# Patient Record
Sex: FEMALE | Race: ASIAN | NOT HISPANIC OR LATINO | Employment: STUDENT | URBAN - METROPOLITAN AREA
[De-identification: names, ages, dates, MRNs, and addresses within clinical notes are randomized per-mention and may not be internally consistent; named-entity substitution may affect disease eponyms.]

---

## 2022-09-14 ENCOUNTER — PROCEDURE VISIT (OUTPATIENT)
Dept: SURGERY | Facility: CLINIC | Age: 22
End: 2022-09-14
Payer: COMMERCIAL

## 2022-09-14 VITALS
WEIGHT: 122 LBS | BODY MASS INDEX: 22.45 KG/M2 | SYSTOLIC BLOOD PRESSURE: 118 MMHG | HEART RATE: 98 BPM | TEMPERATURE: 98.5 F | DIASTOLIC BLOOD PRESSURE: 68 MMHG | OXYGEN SATURATION: 99 % | RESPIRATION RATE: 18 BRPM | HEIGHT: 62 IN

## 2022-09-14 DIAGNOSIS — L73.2 HIDRADENITIS SUPPURATIVA OF LEFT AXILLA: Primary | ICD-10-CM

## 2022-09-14 PROCEDURE — 87070 CULTURE OTHR SPECIMN AEROBIC: CPT | Performed by: SURGERY

## 2022-09-14 PROCEDURE — 87205 SMEAR GRAM STAIN: CPT | Performed by: SURGERY

## 2022-09-14 PROCEDURE — 99203 OFFICE O/P NEW LOW 30 MIN: CPT | Performed by: SURGERY

## 2022-09-14 RX ORDER — SULFAMETHOXAZOLE AND TRIMETHOPRIM 800; 160 MG/1; MG/1
1 TABLET ORAL EVERY 12 HOURS SCHEDULED
COMMUNITY
End: 2022-09-19

## 2022-09-14 RX ORDER — CEPHALEXIN 500 MG/1
500 CAPSULE ORAL EVERY 6 HOURS SCHEDULED
COMMUNITY
End: 2022-09-19

## 2022-09-14 RX ORDER — CLINDAMYCIN HYDROCHLORIDE 300 MG/1
CAPSULE ORAL
COMMUNITY
Start: 2022-08-24

## 2022-09-14 RX ORDER — TRETINOIN 0.5 MG/G
CREAM TOPICAL
COMMUNITY
Start: 2022-08-10

## 2022-09-14 NOTE — PROGRESS NOTES
Assessment/Plan:  I aspirated about 3 mL of fluid from the area of fluctuation  It was clear  I have sent it for cultures  I told her to continue taking oral antibiotics  I will see her next week on Monday and if this forms again or gets worse I will need to do an incision and drainage  I also told her that if the pain starts increasing, she has increased drainage or redness she should call me and then we will do incision and drainage immediately  She verbalized understanding  No problem-specific Assessment & Plan notes found for this encounter  Diagnoses and all orders for this visit:    Hidradenitis suppurativa of left axilla    Other orders  -     clindamycin (CLEOCIN) 300 MG capsule; TAKE 1 CAPSULE EVERY 6 HOURS FOR 10 DAYS  -     tretinoin (REFISSA) 0 05 % cream; APPLY TO THE AFFECTED ON FACE CHEST AND BACK AT BEDTIME (Patient not taking: Reported on 9/14/2022)  -     cephalexin (KEFLEX) 500 mg capsule; Take 500 mg by mouth every 6 (six) hours  -     sulfamethoxazole-trimethoprim (BACTRIM DS) 800-160 mg per tablet; Take 1 tablet by mouth every 12 (twelve) hours          Subjective:      Patient ID: Virginie Claudio is a 22 y o  female  26-year-old female patient with left axillary pain and swelling  No drainage  No fever  Patient has history of hidradenitis suppurative  She has had multiple episodes in the past   Patient started taking oral antibiotics 2 days ago  The following portions of the patient's history were reviewed and updated as appropriate:   She  has no past medical history on file  She There are no problems to display for this patient  She  has no past surgical history on file  Her family history is not on file  She  reports that she has never smoked  She has never used smokeless tobacco  No history on file for alcohol use and drug use    Current Outpatient Medications   Medication Sig Dispense Refill    cephalexin (KEFLEX) 500 mg capsule Take 500 mg by mouth every 6 (six) hours      clindamycin (CLEOCIN) 300 MG capsule TAKE 1 CAPSULE EVERY 6 HOURS FOR 10 DAYS      sulfamethoxazole-trimethoprim (BACTRIM DS) 800-160 mg per tablet Take 1 tablet by mouth every 12 (twelve) hours      tretinoin (REFISSA) 0 05 % cream APPLY TO THE AFFECTED ON FACE CHEST AND BACK AT BEDTIME (Patient not taking: Reported on 9/14/2022)       No current facility-administered medications for this visit  Current Outpatient Medications on File Prior to Visit   Medication Sig    cephalexin (KEFLEX) 500 mg capsule Take 500 mg by mouth every 6 (six) hours    clindamycin (CLEOCIN) 300 MG capsule TAKE 1 CAPSULE EVERY 6 HOURS FOR 10 DAYS    sulfamethoxazole-trimethoprim (BACTRIM DS) 800-160 mg per tablet Take 1 tablet by mouth every 12 (twelve) hours    tretinoin (REFISSA) 0 05 % cream APPLY TO THE AFFECTED ON FACE CHEST AND BACK AT BEDTIME (Patient not taking: Reported on 9/14/2022)     No current facility-administered medications on file prior to visit  She has No Known Allergies       Review of Systems   Constitutional: Negative  HENT: Negative  Eyes: Negative  Respiratory: Negative  Cardiovascular: Negative  Gastrointestinal: Negative  Endocrine: Negative  Genitourinary: Negative  Musculoskeletal: Negative  Allergic/Immunologic: Negative  Neurological: Negative  Hematological: Negative  Psychiatric/Behavioral: Negative  Objective:      /68   Pulse 98   Temp 98 5 °F (36 9 °C) (Tympanic)   Resp 18   Ht 5' 2" (1 575 m)   Wt 55 3 kg (122 lb)   SpO2 99%   BMI 22 31 kg/m²          Physical Exam  Vitals reviewed  Exam conducted with a chaperone present  Constitutional:       Appearance: Normal appearance  HENT:      Head: Normocephalic and atraumatic  Nose: Nose normal       Mouth/Throat:      Mouth: Mucous membranes are moist    Eyes:      Pupils: Pupils are equal, round, and reactive to light     Lymphadenopathy:      Comments: Left axilla shows areas of scarring from previous episodes of hidradenitis  There is an area of fluctuation with tenderness  There is no drainage  There is no erythema  Neurological:      Mental Status: She is alert

## 2022-09-18 ENCOUNTER — NURSE TRIAGE (OUTPATIENT)
Dept: OTHER | Facility: OTHER | Age: 22
End: 2022-09-18

## 2022-09-18 LAB
BACTERIA WND AEROBE CULT: NO GROWTH
GRAM STN SPEC: NORMAL
GRAM STN SPEC: NORMAL

## 2022-09-18 NOTE — TELEPHONE ENCOUNTER
Reason for Disposition   Hives or itching   Taking new prescription antibiotic (Exception: finished taking new prescription antibiotic)    Answer Assessment - Initial Assessment Questions  1  APPEARANCE of RASH: "Describe the rash " (e g , spots, blisters, raised areas, skin peeling, scaly)      Hives  2  SIZE: "How big are the spots?" (e g , tip of pen, eraser, coin; inches, centimeters)      Different   3  LOCATION: "Where is the rash located?"      Face, arms ,chest, legs  4  COLOR: "What color is the rash?" (Note: It is difficult to assess rash color in people with darker-colored skin  When this situation occurs, simply ask the caller to describe what they see )      Red bumps   5  ONSET: "When did the rash begin?"      This morning   6  FEVER: "Do you have a fever?" If so, ask: "What is your temperature, how was it measured, and when did it start?"      no  7  ITCHING: "Does the rash itch?" If so, ask: "How bad is the itch?" (Scale 1-10; or mild, moderate, severe)      Yes  Mild to moderate  8  CAUSE: "What do you think is causing the rash?"      Bactrim DS  9  NEW MEDICATION: "What new medication are you taking?" (e g , name of antibiotic) "When did you start taking this medication?"  Bactrim DS  10  OTHER SYMPTOMS: "Do you have any other symptoms?" (e g , sore throat, fever, joint pain)        no  11   PREGNANCY: "Is there any chance you are pregnant?" "When was your last menstrual period?"        no    Protocols used: RASH - WIDESPREAD WHILE ON DRUGS-ADULT-OH, RASH - WIDESPREAD ON DRUGS-ADULT-AH

## 2022-09-18 NOTE — TELEPHONE ENCOUNTER
Regarding: Alergic reaction to medication   ----- Message from Petey Nielson sent at 9/18/2022 10:34 AM EDT -----  "I am having an alergic reaction to this medication sulfamethoxazole-trimethoprim (BACTRIM DS) 800-160 mg per tablet    I'm getting hives, top lip swollen and on my body "

## 2022-09-19 ENCOUNTER — PROCEDURE VISIT (OUTPATIENT)
Dept: SURGERY | Facility: CLINIC | Age: 22
End: 2022-09-19
Payer: COMMERCIAL

## 2022-09-19 VITALS
RESPIRATION RATE: 18 BRPM | OXYGEN SATURATION: 98 % | DIASTOLIC BLOOD PRESSURE: 62 MMHG | WEIGHT: 122.6 LBS | TEMPERATURE: 99.6 F | SYSTOLIC BLOOD PRESSURE: 104 MMHG | HEART RATE: 84 BPM | BODY MASS INDEX: 22.56 KG/M2 | HEIGHT: 62 IN

## 2022-09-19 DIAGNOSIS — T50.905A DRUG REACTION: Primary | ICD-10-CM

## 2022-09-19 DIAGNOSIS — L73.2 HIDRADENITIS SUPPURATIVA OF LEFT AXILLA: ICD-10-CM

## 2022-09-19 PROCEDURE — 99213 OFFICE O/P EST LOW 20 MIN: CPT | Performed by: SURGERY

## 2022-09-19 RX ORDER — PREDNISONE 1 MG/1
5 TABLET ORAL DAILY
Qty: 3 TABLET | Refills: 0 | Status: SHIPPED | OUTPATIENT
Start: 2022-09-19

## 2022-09-19 NOTE — LETTER
September 19, 2022     Patient: Sherri Thakkar  YOB: 2000  Date of Visit: 9/19/2022      To Whom it May Concern:    Sherri Thakkar is under my professional care  Tash Doty was seen in my office on 9/19/2022  Tash Doty  may return to school on 09/26/2022  If you have any questions or concerns, please don't hesitate to call           Sincerely,          Shalonda Dumont MD        CC: No Recipients

## 2022-09-19 NOTE — PROGRESS NOTES
Assessment/Plan:  Her rash has not improved after Benadryl  I am sending her prescription for 3 days of prednisone 5 mg  I also think that rash could be either due to Keflex as she likely had similar rash earlier  This could also be due to Bactrim as she tells me that she was told that she was allergic to sulfur  In any case I told her to stop both the antibiotics  Her hidradenitis has resolved  I am sending her to see physical therapy for left upper extremity pain  Follow-up with me p r n  No problem-specific Assessment & Plan notes found for this encounter  Diagnoses and all orders for this visit:    Drug reaction  -     predniSONE 5 mg tablet; Take 1 tablet (5 mg total) by mouth daily    Hidradenitis suppurativa of left axilla          Subjective:      Patient ID: Virginie Claudio is a 22 y o  female  52-year-old female patient came here for follow-up  Patient tells me that her pain in the left axilla is completely resolved  She however tells me that her arm pain is still there  No fever today  Patient started having rash over her body with urticaria yesterday  She says she had similar rash with Keflex  Patient had been taking Keflex and Bactrim for last 6 days  She stopped taking it yesterday  [facility] and [facility]  She says her rash has improved but is not gone completely  She does complain of some itching  The following portions of the patient's history were reviewed and updated as appropriate: allergies, current medications, past family history, past medical history, past social history, past surgical history and problem list     Review of Systems   All other systems reviewed and are negative  Objective:      /62   Pulse 84   Temp 99 6 °F (37 6 °C) (Tympanic)   Resp 18   Ht 5' 2" (1 575 m)   Wt 55 6 kg (122 lb 9 6 oz)   SpO2 98%   BMI 22 42 kg/m²          Physical Exam  Vitals reviewed     Constitutional:       Appearance: Normal appearance  HENT:      Head: Normocephalic and atraumatic  Skin:     Findings: Rash (Papular rash all over the exposed skin ) present  Comments: No left axillary tenderness  No tenderness over her left upper extremity  Neurological:      General: No focal deficit present  Mental Status: She is alert and oriented to person, place, and time

## 2022-09-23 DIAGNOSIS — M79.89 PAIN AND SWELLING OF UPPER EXTREMITY, LEFT: Primary | ICD-10-CM

## 2022-09-23 DIAGNOSIS — M79.602 PAIN AND SWELLING OF UPPER EXTREMITY, LEFT: Primary | ICD-10-CM

## 2022-09-23 DIAGNOSIS — T50.905A DRUG REACTION: ICD-10-CM

## 2022-09-28 ENCOUNTER — OFFICE VISIT (OUTPATIENT)
Dept: SURGERY | Facility: CLINIC | Age: 22
End: 2022-09-28
Payer: COMMERCIAL

## 2022-09-28 ENCOUNTER — TELEPHONE (OUTPATIENT)
Dept: SURGERY | Facility: CLINIC | Age: 22
End: 2022-09-28

## 2022-09-28 VITALS
RESPIRATION RATE: 18 BRPM | SYSTOLIC BLOOD PRESSURE: 116 MMHG | BODY MASS INDEX: 22.82 KG/M2 | OXYGEN SATURATION: 98 % | DIASTOLIC BLOOD PRESSURE: 74 MMHG | TEMPERATURE: 97.9 F | HEIGHT: 62 IN | WEIGHT: 124 LBS | HEART RATE: 100 BPM

## 2022-09-28 DIAGNOSIS — R22.32 LUMP IN ARMPIT, LEFT: Primary | ICD-10-CM

## 2022-09-28 PROCEDURE — 99213 OFFICE O/P EST LOW 20 MIN: CPT | Performed by: SURGERY

## 2022-09-28 NOTE — PROGRESS NOTES
Assessment/Plan:  After taking verbal consent from the patient under full septic precautions I tried to aspirate the area  There was no pus  I explained to the patient that I am going to order an ultrasound  This would be for the lump in her left axilla  This is most likely an enlarged lymph node due to a recent episode of hidradenitis  If this turns out to be a different pathology then she may need excision  She verbalized understanding  I will see her after the ultrasound has been performed  No problem-specific Assessment & Plan notes found for this encounter  Diagnoses and all orders for this visit:    Lump in armpit, left  -     US axilla; Future          Subjective:      Patient ID: Curry Cardona is a 22 y o  female  52-year-old female patient who is very well known to me  She came in because of a swelling in her left armpit  Patient has multiple episodes of hydradenitis the past   She recently has recovered from an episode when she was treated with the oral antibiotics  At that time she developed rash to oral antibiotics  Her rash is completely resolved  Patient is worried that she has a lump in her armpit which may get infected again  No fever  No drainage  The following portions of the patient's history were reviewed and updated as appropriate: allergies, current medications, past family history, past medical history, past social history, past surgical history and problem list     Review of Systems   Constitutional: Negative  HENT: Negative  Eyes: Negative  Respiratory: Negative  Cardiovascular: Negative  Gastrointestinal: Negative  Endocrine: Negative  Genitourinary: Negative  Musculoskeletal: Negative            Objective:      /74 (BP Location: Left arm, Patient Position: Sitting, Cuff Size: Standard)   Pulse 100   Temp 97 9 °F (36 6 °C) (Tympanic)   Resp 18   Ht 5' 2" (1 575 m)   Wt 56 2 kg (124 lb)   SpO2 98%   BMI 22 68 kg/m² Physical Exam  Vitals reviewed  Constitutional:       Appearance: Normal appearance  HENT:      Head: Normocephalic and atraumatic  Nose: Nose normal       Mouth/Throat:      Mouth: Mucous membranes are moist    Eyes:      Pupils: Pupils are equal, round, and reactive to light  Cardiovascular:      Rate and Rhythm: Normal rate and regular rhythm  Chest:   Breasts:      Left: Axillary adenopathy (Left axillary lymph node enlargement  It is nontender ) present  Musculoskeletal:      Cervical back: Normal range of motion  Lymphadenopathy:      Upper Body:      Left upper body: Axillary adenopathy (Left axillary lymph node enlargement  It is nontender ) present  Comments: Left axillary skin shows stigmata of previous hidradenitis scars  No drainage  No open wounds  Neurological:      Mental Status: She is alert

## 2022-09-28 NOTE — TELEPHONE ENCOUNTER
Pt called in and lm stating that her under arm is inflammed and red, she wanted to know if it was possible to come in for an appt this week and wanted a call back to discuss what is going on

## 2022-10-03 ENCOUNTER — HOSPITAL ENCOUNTER (OUTPATIENT)
Dept: RADIOLOGY | Age: 22
Discharge: HOME | End: 2022-10-03
Payer: COMMERCIAL

## 2022-10-03 ENCOUNTER — TELEPHONE (OUTPATIENT)
Dept: OTHER | Facility: OTHER | Age: 22
End: 2022-10-03

## 2022-10-03 DIAGNOSIS — R22.32 LUMP IN ARMPIT, LEFT: ICD-10-CM

## 2022-10-03 DIAGNOSIS — R22.32 LUMP IN ARMPIT, LEFT: Primary | ICD-10-CM

## 2022-10-03 PROCEDURE — 76882 US LMTD JT/FCL EVL NVASC XTR: CPT

## 2022-10-04 ENCOUNTER — PREP FOR PROCEDURE (OUTPATIENT)
Dept: INTERVENTIONAL RADIOLOGY/VASCULAR | Facility: CLINIC | Age: 22
End: 2022-10-04

## 2022-10-04 ENCOUNTER — TELEPHONE (OUTPATIENT)
Dept: RADIOLOGY | Facility: HOSPITAL | Age: 22
End: 2022-10-04

## 2022-10-04 DIAGNOSIS — R22.32 AXILLARY LUMP, LEFT: Primary | ICD-10-CM

## 2022-10-04 NOTE — TELEPHONE ENCOUNTER
Patient has seen Dr Rosalina Rollins and recently had an ultrasound of the Axilla which showed a fluid collection  There is an order in the system from Dr Rosalina Rollins to have the axillia aspirated  Patient is trying to get in touch with [facility] scheduling to schedule this through [facility]

## 2022-10-11 ENCOUNTER — HOSPITAL ENCOUNTER (OUTPATIENT)
Dept: RADIOLOGY | Facility: HOSPITAL | Age: 22
Discharge: HOME | End: 2022-10-11
Attending: RADIOLOGY | Admitting: RADIOLOGY
Payer: COMMERCIAL

## 2022-10-11 DIAGNOSIS — R22.32 AXILLARY LUMP, LEFT: ICD-10-CM

## 2022-10-11 PROCEDURE — 10030 IMG GID FLU COLL DRG SFT TIS: CPT

## 2022-10-11 PROCEDURE — 88341 IMHCHEM/IMCYTCHM EA ADD ANTB: CPT | Performed by: PATHOLOGY

## 2022-10-11 PROCEDURE — 88305 TISSUE EXAM BY PATHOLOGIST: CPT | Performed by: PATHOLOGY

## 2022-10-11 PROCEDURE — 87077 CULTURE AEROBIC IDENTIFY: CPT | Performed by: SURGERY

## 2022-10-11 PROCEDURE — 10160 PNXR ASPIR ABSC HMTMA BULLA: CPT | Performed by: RADIOLOGY

## 2022-10-11 PROCEDURE — C1729 CATH, DRAINAGE: HCPCS

## 2022-10-11 PROCEDURE — 88342 IMHCHEM/IMCYTCHM 1ST ANTB: CPT | Performed by: PATHOLOGY

## 2022-10-11 PROCEDURE — 87075 CULTR BACTERIA EXCEPT BLOOD: CPT | Performed by: SURGERY

## 2022-10-11 PROCEDURE — 76942 ECHO GUIDE FOR BIOPSY: CPT | Performed by: RADIOLOGY

## 2022-10-11 PROCEDURE — 88365 INSITU HYBRIDIZATION (FISH): CPT | Performed by: PATHOLOGY

## 2022-10-11 PROCEDURE — 20206 BIOPSY MUSCLE PERQ NEEDLE: CPT | Performed by: RADIOLOGY

## 2022-10-11 PROCEDURE — 88364 INSITU HYBRIDIZATION (FISH): CPT | Performed by: PATHOLOGY

## 2022-10-11 PROCEDURE — 87186 SC STD MICRODIL/AGAR DIL: CPT | Performed by: SURGERY

## 2022-10-11 PROCEDURE — 87070 CULTURE OTHR SPECIMN AEROBIC: CPT | Performed by: SURGERY

## 2022-10-11 PROCEDURE — 87205 SMEAR GRAM STAIN: CPT | Performed by: SURGERY

## 2022-10-11 NOTE — DISCHARGE INSTRUCTIONS
Abscess/fluid collection Aspiration      WHAT YOU NEED TO KNOW:     Today you underwent a fluid collection/abscess aspiration  Usually the fluid is sent to the lab for culture  You may have some pain associated with the puncture site  The Procedure is performed with Local anesthesia (Lidocaine) and sometimes with local and IV Sedation  After you go Home:    Home care  These tips can help your wound heal:  The wound may drain for the first 2 days  Cover the wound with a clean dry dressing  Change the dressing if it becomes soaked with blood or pus  If a gauze packing was placed inside the abscess pocket, you may be told to remove it yourself  You may do this in the shower  Once the packing is removed, you should wash the area in the shower, or clean the area as directed by your provider  Continue to do this until the skin opening has closed  Make sure you wash your hands after changing the packing or cleaning the wound  If you were prescribed antibiotics, take them as directed until they are all gone  You may use acetaminophen or ibuprofen to control pain, unless another pain medicine was prescribed  If you have liver disease or ever had a stomach ulcer, talk with your doctor before using these medicines  Follow-up care  Follow up with your healthcare provider, or as advised  If a gauze packing was put in your wound, it should be removed in 1 to 2 days  Check your wound every day for any signs that the infection is getting worse  The signs are listed below  When to seek medical advice  Call your healthcare provider right away if any of these occur:   Increasing redness or swelling  Red streaks in the skin leading away from the wound  Increasing local pain or swelling  Continued pus draining from the wound 2 days after treatment  Fever of 100 4ºF (38ºC) or higher, or as directed by your healthcare provider  Abscess  returns when you are at home

## 2022-10-11 NOTE — SEDATION DOCUMENTATION
IR left axillary complex collection aspiration and tissue biopsy by Dr Farley  Tissue and fluid sent for cultures  Band-aid placed on site  Patient tolerated procedure well  AVS given to patient and patient d/c home

## 2022-10-12 LAB — SCAN RESULT: NORMAL

## 2022-10-15 LAB
BACTERIA SPEC ANAEROBE CULT: ABNORMAL
BACTERIA SPEC ANAEROBE CULT: ABNORMAL

## 2022-10-16 LAB
BACTERIA SPEC BFLD CULT: ABNORMAL
GRAM STN SPEC: ABNORMAL
GRAM STN SPEC: ABNORMAL

## 2022-10-18 ENCOUNTER — TELEPHONE (OUTPATIENT)
Dept: SURGERY | Facility: CLINIC | Age: 22
End: 2022-10-18

## 2022-10-18 NOTE — TELEPHONE ENCOUNTER
Patient called looking for her results  She said that she keeps getting messages in [address] saying they are there but no one from our office has called with results  She is also wondering if she needs to come in to discuss the results with you or not

## 2023-06-12 ENCOUNTER — NEW PATIENT (OUTPATIENT)
Dept: URBAN - METROPOLITAN AREA CLINIC 46 | Facility: CLINIC | Age: 23
End: 2023-06-12

## 2023-06-12 DIAGNOSIS — H35.413: ICD-10-CM

## 2023-06-12 PROCEDURE — 92134 CPTRZ OPH DX IMG PST SGM RTA: CPT

## 2023-06-12 PROCEDURE — 92014 COMPRE OPH EXAM EST PT 1/>: CPT

## 2023-06-12 PROCEDURE — 92202 OPSCPY EXTND ON/MAC DRAW: CPT

## 2023-06-12 ASSESSMENT — TONOMETRY
OS_IOP_MMHG: 15
OD_IOP_MMHG: 16

## 2023-06-12 ASSESSMENT — VISUAL ACUITY
OS_CC: 20/30
OD_CC: 20/40

## 2024-08-01 ENCOUNTER — FOLLOW UP (OUTPATIENT)
Dept: URBAN - METROPOLITAN AREA CLINIC 105 | Facility: CLINIC | Age: 24
End: 2024-08-01

## 2024-08-01 DIAGNOSIS — H35.413: ICD-10-CM

## 2024-08-01 PROCEDURE — 67145 PROPH RTA DTCHMNT PC: CPT

## 2024-08-01 PROCEDURE — 92134 CPTRZ OPH DX IMG PST SGM RTA: CPT

## 2024-08-01 PROCEDURE — 92014 COMPRE OPH EXAM EST PT 1/>: CPT | Mod: 25

## 2024-08-01 PROCEDURE — 92201 OPSCPY EXTND RTA DRAW UNI/BI: CPT | Mod: 59

## 2024-08-01 ASSESSMENT — TONOMETRY
OS_IOP_MMHG: 16
OD_IOP_MMHG: 17

## 2024-08-01 ASSESSMENT — VISUAL ACUITY
OS_SC: 20/20+2
OD_SC: 20/25-1

## 2024-08-15 ENCOUNTER — PROCEDURE ONLY (OUTPATIENT)
Dept: URBAN - METROPOLITAN AREA CLINIC 105 | Facility: CLINIC | Age: 24
End: 2024-08-15

## 2024-08-15 DIAGNOSIS — H35.413: ICD-10-CM

## 2024-08-15 PROCEDURE — 67145 PROPH RTA DTCHMNT PC: CPT

## 2024-08-15 ASSESSMENT — TONOMETRY
OD_IOP_MMHG: 15
OS_IOP_MMHG: 16

## 2024-08-15 ASSESSMENT — VISUAL ACUITY
OS_SC: 20/20
OD_SC: 20/20-1